# Patient Record
(demographics unavailable — no encounter records)

---

## 2025-05-05 NOTE — PHYSICAL EXAM
[MA] : MA [FreeTextEntry2] : Shameka [Appropriately responsive] : appropriately responsive [Alert] : alert [No Acute Distress] : no acute distress [Soft] : soft [Non-tender] : non-tender [Non-distended] : non-distended [No HSM] : No HSM [No Mass] : no mass [Oriented x3] : oriented x3 [FreeTextEntry6] : no bilateral axillary LAD [Examination Of The Breasts] : a normal appearance [No Discharge] : no discharge [No Masses] : no breast masses were palpable [No Lesions] : no lesions  [Labia Majora] : normal [Labia Minora] : normal [Normal] : normal [Tenderness] : nontender [Enlarged ___ wks] : not enlarged [Uterine Adnexae] : normal [FreeTextEntry5] : no CMT

## 2025-05-05 NOTE — PLAN
[FreeTextEntry1] : Reviewed correct pill use and danger signs.  HPV# 1 given today.  F/u to complete HPV series.

## 2025-05-05 NOTE — HISTORY OF PRESENT ILLNESS
[Patient reported PAP Smear was normal] : Patient reported PAP Smear was normal [FreeTextEntry1] : 36 y/o  presents for annual GYN exam.  Declined translating services.  SAB x 2.  C-SEC x 1.  , and occasionally sexually active as  lives in [place]. Declines STD testing.  Light regular periods on OCPs. ([place]- brought from her country). Very happy with this pill and would like [place] equivalent.  Overall good health.  No history of cervical dysplasia.  Never had HPV vaccine.  Denies FH of ca of ovary, colon, breast or uterus.  Lives with her daughter aged 11 and her extended family.    [PapSmeardate] : 06/24